# Patient Record
Sex: FEMALE | Race: WHITE | NOT HISPANIC OR LATINO | URBAN - METROPOLITAN AREA
[De-identification: names, ages, dates, MRNs, and addresses within clinical notes are randomized per-mention and may not be internally consistent; named-entity substitution may affect disease eponyms.]

---

## 2018-06-30 ENCOUNTER — EMERGENCY (EMERGENCY)
Facility: HOSPITAL | Age: 68
LOS: 0 days | Discharge: HOME | End: 2018-06-30
Admitting: PHYSICIAN ASSISTANT

## 2018-06-30 VITALS
DIASTOLIC BLOOD PRESSURE: 78 MMHG | SYSTOLIC BLOOD PRESSURE: 140 MMHG | OXYGEN SATURATION: 99 % | TEMPERATURE: 98 F | HEART RATE: 80 BPM | RESPIRATION RATE: 18 BRPM

## 2018-06-30 VITALS
SYSTOLIC BLOOD PRESSURE: 168 MMHG | DIASTOLIC BLOOD PRESSURE: 80 MMHG | RESPIRATION RATE: 18 BRPM | HEART RATE: 110 BPM | OXYGEN SATURATION: 96 % | WEIGHT: 184.97 LBS | TEMPERATURE: 98 F | HEIGHT: 60 IN
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
## 2018-06-30 DIAGNOSIS — M79.89 OTHER SPECIFIED SOFT TISSUE DISORDERS: ICD-10-CM

## 2018-06-30 DIAGNOSIS — M25.569 PAIN IN UNSPECIFIED KNEE: ICD-10-CM

## 2018-06-30 DIAGNOSIS — M25.562 PAIN IN LEFT KNEE: ICD-10-CM

## 2018-06-30 DIAGNOSIS — S89.92XA UNSPECIFIED INJURY OF LEFT LOWER LEG, INITIAL ENCOUNTER: ICD-10-CM
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
## 2018-06-30 DIAGNOSIS — E11.9 TYPE 2 DIABETES MELLITUS WITHOUT COMPLICATIONS: ICD-10-CM

## 2018-06-30 RX ORDER — IBUPROFEN 200 MG
600 TABLET ORAL ONCE
Qty: 0 | Refills: 0 | Status: COMPLETED | OUTPATIENT
Start: 2018-06-30 | End: 2018-06-30

## 2018-06-30 RX ADMIN — Medication 600 MILLIGRAM(S): at 13:51

## 2018-06-30 RX ADMIN — Medication 600 MILLIGRAM(S): at 12:49

## 2018-06-30 NOTE — ED PROVIDER NOTE - OBJECTIVE STATEMENT
66 y/o female with hx DM presents to the ED c/o "I was sitting on the edge of the pool and there were 2 ledges and I jumped over one twisting my knee yesterday. I am having pain to my knee now." no numbness/ tingling/ back pain/ weakness

## 2020-10-20 PROBLEM — Z00.00 ENCOUNTER FOR PREVENTIVE HEALTH EXAMINATION: Status: ACTIVE | Noted: 2020-10-20

## 2020-10-21 ENCOUNTER — APPOINTMENT (OUTPATIENT)
Dept: CARDIOLOGY | Facility: CLINIC | Age: 70
End: 2020-10-21
Payer: MEDICARE

## 2020-10-21 ENCOUNTER — RESULT CHARGE (OUTPATIENT)
Age: 70
End: 2020-10-21

## 2020-10-21 VITALS — HEIGHT: 60 IN | WEIGHT: 121 LBS | TEMPERATURE: 96.7 F | BODY MASS INDEX: 23.75 KG/M2

## 2020-10-21 VITALS — HEART RATE: 64 BPM | SYSTOLIC BLOOD PRESSURE: 120 MMHG | DIASTOLIC BLOOD PRESSURE: 80 MMHG | RESPIRATION RATE: 18 BRPM

## 2020-10-21 DIAGNOSIS — Z78.9 OTHER SPECIFIED HEALTH STATUS: ICD-10-CM

## 2020-10-21 DIAGNOSIS — Z83.3 FAMILY HISTORY OF DIABETES MELLITUS: ICD-10-CM

## 2020-10-21 DIAGNOSIS — Z87.891 PERSONAL HISTORY OF NICOTINE DEPENDENCE: ICD-10-CM

## 2020-10-21 PROCEDURE — 93000 ELECTROCARDIOGRAM COMPLETE: CPT

## 2020-10-21 PROCEDURE — 99204 OFFICE O/P NEW MOD 45 MIN: CPT

## 2020-10-21 NOTE — PHYSICAL EXAM
[General Appearance - Well Developed] : well developed [Normal Appearance] : normal appearance [Well Groomed] : well groomed [General Appearance - Well Nourished] : well nourished [No Deformities] : no deformities [General Appearance - In No Acute Distress] : no acute distress [Normal Conjunctiva] : the conjunctiva exhibited no abnormalities [Eyelids - No Xanthelasma] : the eyelids demonstrated no xanthelasmas [Normal Oral Mucosa] : normal oral mucosa [No Oral Pallor] : no oral pallor [No Oral Cyanosis] : no oral cyanosis [Normal Jugular Venous A Waves Present] : normal jugular venous A waves present [Normal Jugular Venous V Waves Present] : normal jugular venous V waves present [No Jugular Venous Ramirez A Waves] : no jugular venous ramirez A waves [Heart Rate And Rhythm] : heart rate and rhythm were normal [Heart Sounds] : normal S1 and S2 [Murmurs] : no murmurs present [Respiration, Rhythm And Depth] : normal respiratory rhythm and effort [Exaggerated Use Of Accessory Muscles For Inspiration] : no accessory muscle use [Auscultation Breath Sounds / Voice Sounds] : lungs were clear to auscultation bilaterally [Bowel Sounds] : normal bowel sounds [Abdomen Soft] : soft [Abdomen Tenderness] : non-tender [Abdomen Mass (___ Cm)] : no abdominal mass palpated [Abnormal Walk] : normal gait [Gait - Sufficient For Exercise Testing] : the gait was sufficient for exercise testing [Nail Clubbing] : no clubbing of the fingernails [Cyanosis, Localized] : no localized cyanosis [Petechial Hemorrhages (___cm)] : no petechial hemorrhages [Skin Color & Pigmentation] : normal skin color and pigmentation [] : no rash [No Venous Stasis] : no venous stasis [Skin Lesions] : no skin lesions [No Skin Ulcers] : no skin ulcer [No Xanthoma] : no  xanthoma was observed [Oriented To Time, Place, And Person] : oriented to person, place, and time

## 2021-01-26 ENCOUNTER — APPOINTMENT (OUTPATIENT)
Dept: CARDIOLOGY | Facility: CLINIC | Age: 71
End: 2021-01-26
Payer: MEDICARE

## 2021-01-26 PROCEDURE — 93015 CV STRESS TEST SUPVJ I&R: CPT

## 2021-01-26 PROCEDURE — 93306 TTE W/DOPPLER COMPLETE: CPT

## 2021-02-04 ENCOUNTER — APPOINTMENT (OUTPATIENT)
Dept: CARDIOLOGY | Facility: CLINIC | Age: 71
End: 2021-02-04
Payer: MEDICARE

## 2021-02-04 DIAGNOSIS — R00.2 PALPITATIONS: ICD-10-CM

## 2021-02-04 DIAGNOSIS — Z86.39 PERSONAL HISTORY OF OTHER ENDOCRINE, NUTRITIONAL AND METABOLIC DISEASE: ICD-10-CM

## 2021-02-04 PROCEDURE — 99213 OFFICE O/P EST LOW 20 MIN: CPT | Mod: 95

## 2021-02-04 NOTE — HISTORY OF PRESENT ILLNESS
[Home] : at home, [unfilled] , at the time of the visit. [Medical Office: ([facility])___] : at the medical office located in  [Verbal consent obtained from patient] : the patient, [unfilled]

## 2021-04-20 ENCOUNTER — RESULT REVIEW (OUTPATIENT)
Age: 71
End: 2021-04-20

## 2022-01-20 ENCOUNTER — APPOINTMENT (OUTPATIENT)
Dept: CARDIOLOGY | Facility: CLINIC | Age: 72
End: 2022-01-20

## 2022-02-03 ENCOUNTER — APPOINTMENT (OUTPATIENT)
Dept: CARDIOLOGY | Facility: CLINIC | Age: 72
End: 2022-02-03

## 2024-03-11 ENCOUNTER — APPOINTMENT (OUTPATIENT)
Dept: CARDIOLOGY | Facility: CLINIC | Age: 74
End: 2024-03-11
Payer: MEDICARE

## 2024-03-11 VITALS — RESPIRATION RATE: 18 BRPM | HEART RATE: 89 BPM

## 2024-03-11 VITALS — HEIGHT: 60 IN | BODY MASS INDEX: 34.75 KG/M2 | WEIGHT: 177 LBS

## 2024-03-11 VITALS — SYSTOLIC BLOOD PRESSURE: 126 MMHG | DIASTOLIC BLOOD PRESSURE: 84 MMHG

## 2024-03-11 DIAGNOSIS — E11.9 TYPE 2 DIABETES MELLITUS W/OUT COMPLICATIONS: ICD-10-CM

## 2024-03-11 PROCEDURE — 93000 ELECTROCARDIOGRAM COMPLETE: CPT

## 2024-03-11 PROCEDURE — 99204 OFFICE O/P NEW MOD 45 MIN: CPT

## 2024-03-11 RX ORDER — METFORMIN HYDROCHLORIDE 500 MG/1
500 TABLET, COATED ORAL TWICE DAILY
Refills: 0 | Status: ACTIVE | COMMUNITY

## 2024-03-11 RX ORDER — FEXOFENADINE HCL 180 MG
180 TABLET ORAL
Refills: 0 | Status: ACTIVE | COMMUNITY

## 2024-03-11 RX ORDER — CA/D3/MAG OX/ZINC/COP/MANG/BOR 600 MG-800
TABLET,CHEWABLE ORAL
Refills: 0 | Status: ACTIVE | COMMUNITY

## 2024-03-11 RX ORDER — ASCORBIC ACID 500 MG
TABLET ORAL
Refills: 0 | Status: ACTIVE | COMMUNITY

## 2024-03-11 NOTE — REVIEW OF SYSTEMS
[Negative] : Psychiatric [SOB] : shortness of breath [Dyspnea on exertion] : dyspnea during exertion

## 2024-03-11 NOTE — PHYSICAL EXAM
[Well Developed] : well developed [No Acute Distress] : no acute distress [Well Nourished] : well nourished [Normal Conjunctiva] : normal conjunctiva [Normal Venous Pressure] : normal venous pressure [No Carotid Bruit] : no carotid bruit [Normal S1, S2] : normal S1, S2 [No Murmur] : no murmur [No Rub] : no rub [No Gallop] : no gallop [Good Air Entry] : good air entry [Clear Lung Fields] : clear lung fields [No Respiratory Distress] : no respiratory distress  [Soft] : abdomen soft [Non Tender] : non-tender [Normal Bowel Sounds] : normal bowel sounds [No Masses/organomegaly] : no masses/organomegaly [No Edema] : no edema [Normal Gait] : normal gait [No Clubbing] : no clubbing [No Cyanosis] : no cyanosis [No Rash] : no rash [No Varicosities] : no varicosities [Moves all extremities] : moves all extremities [No Focal Deficits] : no focal deficits [No Skin Lesions] : no skin lesions [Normal Speech] : normal speech [Alert and Oriented] : alert and oriented [Normal memory] : normal memory

## 2024-03-28 ENCOUNTER — APPOINTMENT (OUTPATIENT)
Dept: CARDIOLOGY | Facility: CLINIC | Age: 74
End: 2024-03-28
Payer: MEDICARE

## 2024-03-28 DIAGNOSIS — R06.02 SHORTNESS OF BREATH: ICD-10-CM

## 2024-03-28 DIAGNOSIS — I35.9 NONRHEUMATIC AORTIC VALVE DISORDER, UNSPECIFIED: ICD-10-CM

## 2024-03-28 PROCEDURE — 93016 CV STRESS TEST SUPVJ ONLY: CPT

## 2024-03-28 PROCEDURE — 93306 TTE W/DOPPLER COMPLETE: CPT

## 2024-06-27 LAB
ALBUMIN SERPL ELPH-MCNC: 4.2 G/DL
ALP BLD-CCNC: 104 U/L
ALT SERPL-CCNC: 39 U/L
ANION GAP SERPL CALC-SCNC: 11 MMOL/L
AST SERPL-CCNC: 29 U/L
BILIRUB SERPL-MCNC: 0.4 MG/DL
BUN SERPL-MCNC: 13 MG/DL
CALCIUM SERPL-MCNC: 10.6 MG/DL
CHLORIDE SERPL-SCNC: 104 MMOL/L
CHOLEST SERPL-MCNC: 269 MG/DL
CO2 SERPL-SCNC: 26 MMOL/L
CREAT SERPL-MCNC: 0.7 MG/DL
EGFR: 92 ML/MIN/1.73M2
ESTIMATED AVERAGE GLUCOSE: 157 MG/DL
GLUCOSE SERPL-MCNC: 169 MG/DL
HBA1C MFR BLD HPLC: 7.1 %
HDLC SERPL-MCNC: 49 MG/DL
LDLC SERPL CALC-MCNC: 176 MG/DL
NONHDLC SERPL-MCNC: 220 MG/DL
POTASSIUM SERPL-SCNC: 5.2 MMOL/L
PROT SERPL-MCNC: 6.7 G/DL
SODIUM SERPL-SCNC: 141 MMOL/L
TRIGL SERPL-MCNC: 218 MG/DL

## 2024-07-03 ENCOUNTER — APPOINTMENT (OUTPATIENT)
Dept: CARDIOLOGY | Facility: CLINIC | Age: 74
End: 2024-07-03
Payer: MEDICARE

## 2024-07-03 VITALS — HEART RATE: 95 BPM | RESPIRATION RATE: 18 BRPM | SYSTOLIC BLOOD PRESSURE: 130 MMHG | DIASTOLIC BLOOD PRESSURE: 80 MMHG

## 2024-07-03 VITALS — HEIGHT: 60 IN | BODY MASS INDEX: 34.75 KG/M2 | WEIGHT: 177 LBS

## 2024-07-03 DIAGNOSIS — E11.9 TYPE 2 DIABETES MELLITUS W/OUT COMPLICATIONS: ICD-10-CM

## 2024-07-03 DIAGNOSIS — R06.02 SHORTNESS OF BREATH: ICD-10-CM

## 2024-07-03 DIAGNOSIS — I35.9 NONRHEUMATIC AORTIC VALVE DISORDER, UNSPECIFIED: ICD-10-CM

## 2024-07-03 DIAGNOSIS — E78.2 MIXED HYPERLIPIDEMIA: ICD-10-CM

## 2024-07-03 DIAGNOSIS — R00.2 PALPITATIONS: ICD-10-CM

## 2024-07-03 PROCEDURE — 93000 ELECTROCARDIOGRAM COMPLETE: CPT

## 2024-07-03 PROCEDURE — 99214 OFFICE O/P EST MOD 30 MIN: CPT

## 2024-07-03 PROCEDURE — G2211 COMPLEX E/M VISIT ADD ON: CPT

## 2024-08-17 ENCOUNTER — RESULT REVIEW (OUTPATIENT)
Age: 74
End: 2024-08-17

## 2024-08-19 RX ORDER — ROSUVASTATIN CALCIUM 10 MG/1
10 TABLET, FILM COATED ORAL DAILY
Qty: 90 | Refills: 0 | Status: ACTIVE | COMMUNITY
Start: 2024-08-19 | End: 1900-01-01

## 2025-01-06 ENCOUNTER — APPOINTMENT (OUTPATIENT)
Dept: CARDIOLOGY | Facility: CLINIC | Age: 75
End: 2025-01-06

## 2025-02-03 NOTE — ED PROVIDER NOTE - CROS ED CARDIOVAS ALL NEG
3rd Attempt made to reach patient for [facility]CC call. Left voicemail please call [phone] leave first name, last name, and  [place] will return your call.        negative... No